# Patient Record
Sex: MALE | Race: WHITE | NOT HISPANIC OR LATINO | Employment: FULL TIME | URBAN - METROPOLITAN AREA
[De-identification: names, ages, dates, MRNs, and addresses within clinical notes are randomized per-mention and may not be internally consistent; named-entity substitution may affect disease eponyms.]

---

## 2023-09-11 ENCOUNTER — OFFICE VISIT (OUTPATIENT)
Dept: URGENT CARE | Facility: CLINIC | Age: 68
End: 2023-09-11
Payer: COMMERCIAL

## 2023-09-11 VITALS
HEIGHT: 71 IN | WEIGHT: 285 LBS | HEART RATE: 68 BPM | SYSTOLIC BLOOD PRESSURE: 118 MMHG | TEMPERATURE: 97.8 F | OXYGEN SATURATION: 95 % | BODY MASS INDEX: 39.9 KG/M2 | DIASTOLIC BLOOD PRESSURE: 78 MMHG | RESPIRATION RATE: 15 BRPM

## 2023-09-11 DIAGNOSIS — M79.671 PAIN OF RIGHT HEEL: ICD-10-CM

## 2023-09-11 PROCEDURE — 99203 OFFICE O/P NEW LOW 30 MIN: CPT | Performed by: PHYSICIAN ASSISTANT

## 2023-09-11 PROCEDURE — 3074F SYST BP LT 130 MM HG: CPT | Performed by: PHYSICIAN ASSISTANT

## 2023-09-11 PROCEDURE — 3078F DIAST BP <80 MM HG: CPT | Performed by: PHYSICIAN ASSISTANT

## 2023-09-11 RX ORDER — SPIRONOLACTONE 25 MG/1
25 TABLET ORAL
COMMUNITY
Start: 2023-09-06

## 2023-09-11 RX ORDER — ASPIRIN 81 MG/1
81 TABLET ORAL DAILY
COMMUNITY
Start: 2023-04-05

## 2023-09-11 RX ORDER — PREDNISONE 20 MG/1
TABLET ORAL
Qty: 10 TABLET | Refills: 0 | Status: SHIPPED | OUTPATIENT
Start: 2023-09-11

## 2023-09-11 RX ORDER — AMLODIPINE BESYLATE 10 MG/1
10 TABLET ORAL
COMMUNITY
Start: 2023-06-25

## 2023-09-11 RX ORDER — LISINOPRIL AND HYDROCHLOROTHIAZIDE 25; 20 MG/1; MG/1
2 TABLET ORAL DAILY
COMMUNITY
Start: 2023-05-15

## 2023-09-11 RX ORDER — CARVEDILOL 6.25 MG/1
TABLET ORAL
COMMUNITY
Start: 2023-09-06

## 2023-09-11 RX ORDER — ATORVASTATIN CALCIUM 40 MG/1
40 TABLET, FILM COATED ORAL
COMMUNITY
Start: 2023-04-05 | End: 2024-04-04

## 2023-09-11 ASSESSMENT — ENCOUNTER SYMPTOMS
FEVER: 0
CHILLS: 0

## 2023-09-11 NOTE — PROGRESS NOTES
Subjective:   Daniel Myhre is a 67 y.o. male who presents today with   Chief Complaint   Patient presents with    Gout     Right foot        Foot Problem  This is a new problem. The current episode started 1 to 4 weeks ago. The problem occurs constantly. The problem has been unchanged. Pertinent negatives include no chills or fever. He has tried NSAIDs, rest and ice for the symptoms. The treatment provided mild relief.     Patient is in [place] visiting on a business trip but usually resides in [place] and states he will be traveling back there on Thursday.  No recent injury or trauma.  Patient states he has been trying remedies for plantar fasciitis with no significant relief.    PMH:  has no past medical history on file.  MEDS:   Current Outpatient Medications:     amLODIPine (NORVASC) 10 MG Tab, Take 10 mg by mouth at bedtime., Disp: , Rfl:     aspirin 81 MG EC tablet, Take 81 mg by mouth every day., Disp: , Rfl:     atorvastatin (LIPITOR) 40 MG Tab, Take 40 mg by mouth., Disp: , Rfl:     lisinopril-hydrochlorothiazide (PRINZIDE) 20-25 MG per tablet, Take 2 Tablets by mouth every day., Disp: , Rfl:     spironolactone (ALDACTONE) 25 MG Tab, Take 25 mg by mouth every day., Disp: , Rfl:     carvedilol (COREG) 6.25 MG Tab, TAKE 1 TABLET (6.25 MG) BY MOUTH TWO TIMES A DAY WITH MEALS., Disp: , Rfl:     predniSONE (DELTASONE) 20 MG Tab, Take 1 tab twice daily for 5 days., Disp: 10 Tablet, Rfl: 0  ALLERGIES: No Known Allergies  SURGHX: History reviewed. No pertinent surgical history.  SOCHX:  reports that he has never smoked. He has never used smokeless tobacco. He reports that he does not currently use alcohol. He reports that he does not currently use drugs.  FH: Reviewed with patient, not pertinent to this visit.       Review of Systems   Constitutional:  Negative for chills and fever.   Musculoskeletal:         Right heel pain        Objective:   /78 (BP Location: Right arm, Patient Position: Sitting, BP Cuff  "Size: Adult long)   Pulse 68   Temp 36.6 °C (97.8 °F) (Temporal)   Resp 15   Ht 1.803 m (5' 11\")   Wt (!) 129 kg (285 lb)   SpO2 95%   BMI 39.75 kg/m²   Physical Exam  Vitals and nursing note reviewed.   Constitutional:       General: He is not in acute distress.     Appearance: Normal appearance. He is well-developed. He is not ill-appearing or toxic-appearing.   HENT:      Head: Normocephalic and atraumatic.      Right Ear: Hearing normal.      Left Ear: Hearing normal.   Cardiovascular:      Rate and Rhythm: Normal rate.   Pulmonary:      Effort: Pulmonary effort is normal.   Musculoskeletal:        Feet:       Comments: Patient has full plantarflexion dorsiflexion right foot.  Neurovascular intact distally.  Less than 2 capillary refill.  Tenderness to palpation to the front of the calcaneus of the right foot.  No significant swelling, bruising or erythema.  No open wounds or ulcers.  No other tenderness to palpation to the right foot.   Skin:     General: Skin is warm and dry.   Neurological:      Mental Status: He is alert.      Coordination: Coordination normal.   Psychiatric:         Mood and Affect: Mood normal.         Assessment/Plan:   Assessment    1. Pain of right heel  - predniSONE (DELTASONE) 20 MG Tab; Take 1 tab twice daily for 5 days.  Dispense: 10 Tablet; Refill: 0    Other orders  - amLODIPine (NORVASC) 10 MG Tab; Take 10 mg by mouth at bedtime.  - aspirin 81 MG EC tablet; Take 81 mg by mouth every day.  - atorvastatin (LIPITOR) 40 MG Tab; Take 40 mg by mouth.  - lisinopril-hydrochlorothiazide (PRINZIDE) 20-25 MG per tablet; Take 2 Tablets by mouth every day.  - spironolactone (ALDACTONE) 25 MG Tab; Take 25 mg by mouth every day.  - carvedilol (COREG) 6.25 MG Tab; TAKE 1 TABLET (6.25 MG) BY MOUTH TWO TIMES A DAY WITH MEALS.    Symptoms and presentation appear consistent with likely arthritis versus calcification of the calcaneus of the right foot.  No signs of gout on exam today.  Would " recommend continue with rest, ice, compression and elevation.  Patient does understand possible side effects medication.  He will follow-up with his primary care when he gets back to [place].  Discussed with him that he may need to see an orthopedic or podiatrist specialist if symptoms continue.  No indication for x-ray on exam today.    Differential diagnosis, natural history, supportive care, and indications for immediate follow-up discussed.   Patient given instructions and understanding of medications and treatment.    If not improving in 3-5 days, F/U with PCP or return to  if symptoms worsen.    Patient agreeable to plan.      Please note that this dictation was created using voice recognition software. I have made every reasonable attempt to correct obvious errors, but I expect that there are errors of grammar and possibly content that I did not discover before finalizing the note.    Audie Keane PA-C